# Patient Record
Sex: MALE | Race: WHITE | NOT HISPANIC OR LATINO | URBAN - METROPOLITAN AREA
[De-identification: names, ages, dates, MRNs, and addresses within clinical notes are randomized per-mention and may not be internally consistent; named-entity substitution may affect disease eponyms.]

---

## 2019-07-26 ENCOUNTER — APPOINTMENT (RX ONLY)
Dept: URBAN - METROPOLITAN AREA OTHER 10 | Facility: OTHER | Age: 79
Setting detail: DERMATOLOGY
End: 2019-07-26

## 2019-07-26 DIAGNOSIS — L95.8 OTHER VASCULITIS LIMITED TO THE SKIN: ICD-10-CM

## 2019-07-26 PROBLEM — L30.9 DERMATITIS, UNSPECIFIED: Status: ACTIVE | Noted: 2019-07-26

## 2019-07-26 PROBLEM — I48.91 UNSPECIFIED ATRIAL FIBRILLATION: Status: ACTIVE | Noted: 2019-07-26

## 2019-07-26 PROCEDURE — ? BIOPSY BY PUNCH METHOD

## 2019-07-26 PROCEDURE — 11104 PUNCH BX SKIN SINGLE LESION: CPT

## 2019-07-26 PROCEDURE — ? PRESCRIPTION

## 2019-07-26 RX ORDER — TRIAMCINOLONE ACETONIDE 1 MG/G
CREAM TOPICAL BID
Qty: 80 | Refills: 0 | Status: ERX | COMMUNITY
Start: 2019-07-26

## 2019-07-26 RX ADMIN — TRIAMCINOLONE ACETONIDE: 1 CREAM TOPICAL at 15:49

## 2019-07-26 ASSESSMENT — LOCATION SIMPLE DESCRIPTION DERM: LOCATION SIMPLE: RIGHT PRETIBIAL REGION

## 2019-07-26 ASSESSMENT — LOCATION ZONE DERM: LOCATION ZONE: LEG

## 2019-07-26 ASSESSMENT — LOCATION DETAILED DESCRIPTION DERM: LOCATION DETAILED: RIGHT PROXIMAL PRETIBIAL REGION

## 2019-07-26 NOTE — PROCEDURE: BIOPSY BY PUNCH METHOD
Hemostasis: Electrocautery
Patient Will Remove Sutures At Home?: No
Lab: [id number]
Post-Care Instructions: I reviewed with the patient in detail post-care instructions. Patient is to keep the biopsy site dry overnight, and then apply bacitracin twice daily until healed. Patient may apply hydrogen peroxide soaks to remove any crusting.
Dressing: pressure dressing with telfa
Lab Facility: [id number]
Anesthesia Type: 1% lidocaine with epinephrine
Detail Level: Detailed
Additional Anesthesia Volume In Cc (Will Not Render If 0): 0
Home Suture Removal Text: Patient was provided a home suture removal kit and will remove their sutures at home.  If they have any questions or difficulties they will call the office.
Punch Size In Mm: 3
Deep Sutures: 4-0 Vicryl
Consent: Written consent was obtained and risks were reviewed including but not limited to scarring, infection, bleeding, scabbing, incomplete removal, nerve damage and allergy to anesthesia.
Suture Removal: 14 days
Notification Instructions: Patient will be notified of biopsy results. However, patient instructed to call the office if not contacted within 2 weeks.
Wound Care: Vaseline
Render Post-Care Instructions In Note?: yes
Biopsy Type: H and E
Billing Type: Third-Party Bill
Epidermal Sutures: 6-0 Nylon

## 2019-08-09 ENCOUNTER — APPOINTMENT (RX ONLY)
Dept: URBAN - METROPOLITAN AREA OTHER 10 | Facility: OTHER | Age: 79
Setting detail: DERMATOLOGY
End: 2019-08-09

## 2019-08-09 DIAGNOSIS — L81.4 OTHER MELANIN HYPERPIGMENTATION: ICD-10-CM

## 2019-08-09 DIAGNOSIS — Z48.02 ENCOUNTER FOR REMOVAL OF SUTURES: ICD-10-CM

## 2019-08-09 PROCEDURE — 99213 OFFICE O/P EST LOW 20 MIN: CPT

## 2019-08-09 PROCEDURE — ? SUTURE REMOVAL (GLOBAL PERIOD)

## 2019-08-09 PROCEDURE — ? COUNSELING

## 2019-08-09 ASSESSMENT — LOCATION SIMPLE DESCRIPTION DERM
LOCATION SIMPLE: POSTERIOR NECK
LOCATION SIMPLE: LEFT ANTERIOR NECK
LOCATION SIMPLE: RIGHT PRETIBIAL REGION

## 2019-08-09 ASSESSMENT — LOCATION DETAILED DESCRIPTION DERM
LOCATION DETAILED: RIGHT DISTAL PRETIBIAL REGION
LOCATION DETAILED: LEFT INFERIOR ANTERIOR NECK
LOCATION DETAILED: LEFT MEDIAL TRAPEZIAL NECK

## 2019-08-09 ASSESSMENT — LOCATION ZONE DERM
LOCATION ZONE: LEG
LOCATION ZONE: NECK

## 2019-08-09 NOTE — PROCEDURE: SUTURE REMOVAL (GLOBAL PERIOD)
Detail Level: Detailed
Add 82380 Cpt? (Important Note: In 2017 The Use Of 66991 Is Being Tracked By Cms To Determine Future Global Period Reimbursement For Global Periods): yes

## 2020-08-25 ENCOUNTER — LAB OUTSIDE AN ENCOUNTER (OUTPATIENT)
Dept: URBAN - METROPOLITAN AREA CLINIC 74 | Facility: CLINIC | Age: 80
End: 2020-08-25

## 2020-08-25 ENCOUNTER — OFFICE VISIT (OUTPATIENT)
Dept: URBAN - METROPOLITAN AREA CLINIC 74 | Facility: CLINIC | Age: 80
End: 2020-08-25
Payer: COMMERCIAL

## 2020-08-25 DIAGNOSIS — I48.0 PAROXYSMAL ATRIAL FIBRILLATION: ICD-10-CM

## 2020-08-25 DIAGNOSIS — D64.89 ANEMIA DUE TO OTHER CAUSE: ICD-10-CM

## 2020-08-25 DIAGNOSIS — Z79.01 CHRONIC ANTICOAGULATION: ICD-10-CM

## 2020-08-25 DIAGNOSIS — R63.4 WEIGHT LOSS: ICD-10-CM

## 2020-08-25 PROCEDURE — 82274 ASSAY TEST FOR BLOOD FECAL: CPT | Performed by: INTERNAL MEDICINE

## 2020-08-25 PROCEDURE — 99244 OFF/OP CNSLTJ NEW/EST MOD 40: CPT | Performed by: INTERNAL MEDICINE

## 2020-08-25 PROCEDURE — 99204 OFFICE O/P NEW MOD 45 MIN: CPT | Performed by: INTERNAL MEDICINE

## 2020-08-25 RX ORDER — SODIUM, POTASSIUM,MAG SULFATES 17.5-3.13G
345 ML SOLUTION, RECONSTITUTED, ORAL ORAL 1
Qty: 1 | Refills: 0 | OUTPATIENT

## 2020-08-25 NOTE — HPI-TODAY'S VISIT:
Patient is an 80-year-old white male patient of Dr. Delfino Masters who is referred for consultation, the patient presents with a history of weight loss and anemia. He claims that over the last 6 months he has lost 10 or more pounds of body weight,.in spite off having a good appetite and what he considers to be a normal intake. The patient denied any dysphagia, odynophagia, nausea, vomiting, hematemesis or melena. The patient has occasional right upper quadrant sharp abdominal pain, it occurs mostly when he is driving, it would last seconds at the time.  He denies any jaundice or fever, denies any history of gallstones or liver disease. The patient claims having normal bowel movements, he denies any diarrhea, constipation, rectal bleeding or hematochezia, the family history is negative for colonic polyps or colonic cancer, his daughter did have Crohn's colitis.  The patient states that his last colonoscopy was done over 10 years ago.  He claims that it was normal. Laboratory obtained by primary care on August 10, 2020 revealed a normal CMP, his CBC showed a hemoglobin of 12.9 with a hematocrit of 37.9 MCV 94.5, the patient had negative testing for hepatitis B and C as well as HIV.  The patient's TSH was normal. The patient has agreed to have stool checked for occult blood, we will obtain a CT of the abdomen and pelvis with contrast, we will get a celiac panel, the patient will be scheduled to have a colonoscopy, benefits potential complications and alternatives to colonoscopy were disclosed.

## 2020-08-26 ENCOUNTER — OFFICE VISIT (OUTPATIENT)
Dept: URBAN - METROPOLITAN AREA CLINIC 73 | Facility: CLINIC | Age: 80
End: 2020-08-26
Payer: COMMERCIAL

## 2020-08-26 DIAGNOSIS — R63.4 WEIGHT LOSS: ICD-10-CM

## 2020-08-26 PROCEDURE — 91065 BREATH HYDROGEN/METHANE TEST: CPT | Performed by: INTERNAL MEDICINE

## 2020-08-27 LAB
ENDOMYSIAL ANTIBODY IGA: NEGATIVE
IMMUNOGLOBULIN A, QN, SERUM: 235
T-TRANSGLUTAMINASE (TTG) IGA: <2

## 2020-09-15 ENCOUNTER — OFFICE VISIT (OUTPATIENT)
Dept: URBAN - METROPOLITAN AREA SURGERY CENTER 30 | Facility: SURGERY CENTER | Age: 80
End: 2020-09-15
Payer: COMMERCIAL

## 2020-09-15 DIAGNOSIS — D50.9 ANEMIA, IRON DEFICIENCY: ICD-10-CM

## 2020-09-15 DIAGNOSIS — R93.3 ABN FINDINGS-GI TRACT: ICD-10-CM

## 2020-09-15 PROCEDURE — G9937 DIG OR SURV COLSCO: HCPCS | Performed by: INTERNAL MEDICINE

## 2020-09-15 PROCEDURE — G8907 PT DOC NO EVENTS ON DISCHARG: HCPCS | Performed by: INTERNAL MEDICINE

## 2020-09-15 PROCEDURE — 45378 DIAGNOSTIC COLONOSCOPY: CPT | Performed by: INTERNAL MEDICINE

## 2020-09-15 RX ORDER — SODIUM, POTASSIUM,MAG SULFATES 17.5-3.13G
345 ML SOLUTION, RECONSTITUTED, ORAL ORAL 1
Qty: 1 | Refills: 0 | Status: ACTIVE | COMMUNITY

## 2020-09-27 PROBLEM — 89362005: Status: ACTIVE | Noted: 2020-08-25

## 2020-09-29 ENCOUNTER — OFFICE VISIT (OUTPATIENT)
Dept: URBAN - METROPOLITAN AREA CLINIC 74 | Facility: CLINIC | Age: 80
End: 2020-09-29
Payer: COMMERCIAL

## 2020-09-29 ENCOUNTER — DASHBOARD ENCOUNTERS (OUTPATIENT)
Age: 80
End: 2020-09-29

## 2020-09-29 DIAGNOSIS — I48.0 PAROXYSMAL ATRIAL FIBRILLATION: ICD-10-CM

## 2020-09-29 DIAGNOSIS — Z79.01 CHRONIC ANTICOAGULATION: ICD-10-CM

## 2020-09-29 DIAGNOSIS — N40.0 ENLARGED PROSTATE: ICD-10-CM

## 2020-09-29 DIAGNOSIS — Z86.010 PERSONAL HISTORY OF COLONIC POLYPS: ICD-10-CM

## 2020-09-29 DIAGNOSIS — D64.9 ANEMIA, UNSPECIFIED TYPE: ICD-10-CM

## 2020-09-29 DIAGNOSIS — K76.89 LIVER CYST: ICD-10-CM

## 2020-09-29 PROBLEM — 282825002: Status: ACTIVE | Noted: 2020-08-25

## 2020-09-29 PROBLEM — 85057007: Status: ACTIVE | Noted: 2020-09-27

## 2020-09-29 PROBLEM — 428283002: Status: ACTIVE | Noted: 2020-09-29

## 2020-09-29 PROBLEM — 249607009: Status: ACTIVE | Noted: 2020-09-27

## 2020-09-29 PROBLEM — 271737000: Status: ACTIVE | Noted: 2020-08-25

## 2020-09-29 PROBLEM — 711150003: Status: ACTIVE | Noted: 2020-08-25

## 2020-09-29 PROCEDURE — G8418 CALC BMI BLW LOW PARAM F/U: HCPCS | Performed by: INTERNAL MEDICINE

## 2020-09-29 PROCEDURE — 99213 OFFICE O/P EST LOW 20 MIN: CPT | Performed by: INTERNAL MEDICINE

## 2020-09-29 PROCEDURE — 1036F TOBACCO NON-USER: CPT | Performed by: INTERNAL MEDICINE

## 2020-09-29 RX ORDER — SODIUM, POTASSIUM,MAG SULFATES 17.5-3.13G
345 ML SOLUTION, RECONSTITUTED, ORAL ORAL 1
Qty: 1 | Refills: 0 | Status: DISCONTINUED | COMMUNITY

## 2020-09-29 NOTE — HPI-TODAY'S VISIT:
Patient is an 80-year-old white male patient of Dr. Delfino Masters who is referred for consultation, the patient presents with a history of weight loss and anemia. He claims that over the last 6 months he has lost 10 or more pounds of body weight,.in spite off having a good appetite and what he considers to be a normal intake. The patient denied any dysphagia, odynophagia, nausea, vomiting, hematemesis or melena. The patient has occasional right upper quadrant sharp abdominal pain, it occurs mostly when he is driving, it would last seconds at the time.  He denies any jaundice or fever, denies any history of gallstones or liver disease. The patient claims having normal bowel movements, he denies any diarrhea, constipation, rectal bleeding or hematochezia, the family history is negative for colonic polyps or colonic cancer, his daughter did have Crohn's colitis.  The patient states that his last colonoscopy was done over 10 years ago.  He claims that it was normal. Laboratory obtained by primary care on August 10, 2020 revealed a normal CMP, his CBC showed a hemoglobin of 12.9 with a hematocrit of 37.9 MCV 94.5, the patient had negative testing for hepatitis B and C as well as HIV.  The patient's TSH was normal. The patient has agreed to have stool checked for occult blood, we will obtain a CT of the abdomen and pelvis with contrast, we will get a celiac panel, the patient will be scheduled to have a colonoscopy, benefits potential complications and alternatives to colonoscopy were disclosed. Today September 29, 2020 the patient returns for a follow-up visit, the patient was last seen in the office on August 25, 2020 on consultation with a history of weight loss and anemia.  The patient claimed that over a period of 6 months he has lost over 10 pounds in spite of having a good appetite and what the patient considers to be a normal intake.  The patient denied any upper GI symptoms such as dysphagia, odynophagia, nausea, vomiting, hematemesis or melena.  The patient complaint of occasional right upper quadrant abdominal pain, it occurred mostly while driving.  The patient denied any fever or jaundice, the patient denied any history of gallstones or liver disease. The patient claimed having normal bowel movements, the patient denied any diarrhea, constipation, rectal bleeding or hematochezia.  The patient had a negative history for colonic polyps or colonic cancer, the patient's daughter have Crohn's colitis. The patient had a colonoscopy over 10 years ago he claimed that he was normal.  The patient did have lab with primary care that showed a hemoglobin of 12.9 with a hematocrit of 37.9, the patient was scheduled to have a CT of the abdomen and pelvis with contrast, stool was to be checked for occult blood, a celiac panel was ordered and the patient was scheduled to have a colonoscopy. The celiac panel was reported normal, the patient's Hemosure was negative. The patient CT scan was performed on September 11, 2020 and it revealed a focal short segment of colonic wall thickening in the transverse colon as well as an enlarged prostate The liver showed multiple liver cysts, and normal gallbladder, normal pancreas, normal spleen, normal adrenals and kidneys.  The small bowel appears to be normal The patient had a colonoscopy on September 15, 2020, the colonoscopy revealed nonbleeding internal hemorrhoids, and normal terminal ileum, normal colonic mucosa. Currently the patient is asymptomatic, he claims that he is eating better, his vital signs showed that his weight increased from 115pounds to 119 pounds.  The patient denied any GI symptoms. The patient will return for a follow-up visit as needed and will be scheduled to have a surveillance colonoscopy in 5 years in view of the past history of colonic polyps.

## 2025-05-28 ENCOUNTER — P2P PATIENT RECORD (OUTPATIENT)
Age: 85
End: 2025-05-28

## 2025-06-17 ENCOUNTER — OFFICE VISIT (OUTPATIENT)
Dept: URBAN - METROPOLITAN AREA CLINIC 74 | Facility: CLINIC | Age: 85
End: 2025-06-17

## 2025-06-17 NOTE — HPI-TODAY'S VISIT:
The patient is a 85-year-old male with past medical history as noted below known to Dr. Quintanilla is presenting to our clinic today with complaint of diarrhea.  Diagnostic studies: -- CT scan of abdomen and pelvis with and without IV contrast on 06/12/2025 pending reading.  -- Labs on 06/04/2025 CMP with BUN 31, creatinine 0.6, ALP 54, AST 27, ALT 24, and TB1.9.  CBC with WBC 3.2, hemoglobin 13.6, hematocrit 39.6, and platelet 158.  Hemoglobin A1c 5.4.  Lipid panel with cholesterol 112, triglyceride 35, HDL 61, and LDL 41.  TSH 3.4.  Procedure: -- Colonoscopy on 09/15/2020 by Dr. Quintanilla noted non-bleeding internal hemorrhoids.  The examined portion of the ileum was normal.  Normal mucosa in the entire examined colon.  No specimens collected.  No repeat colonoscopy needed due to advanced age.